# Patient Record
Sex: MALE | Race: WHITE | NOT HISPANIC OR LATINO | Employment: FULL TIME | URBAN - METROPOLITAN AREA
[De-identification: names, ages, dates, MRNs, and addresses within clinical notes are randomized per-mention and may not be internally consistent; named-entity substitution may affect disease eponyms.]

---

## 2023-06-26 ENCOUNTER — HOSPITAL ENCOUNTER (EMERGENCY)
Facility: HOSPITAL | Age: 43
Discharge: HOME | End: 2023-06-26
Attending: EMERGENCY MEDICINE
Payer: MEDICAID

## 2023-06-26 VITALS
HEIGHT: 66 IN | RESPIRATION RATE: 17 BRPM | BODY MASS INDEX: 34.39 KG/M2 | HEART RATE: 100 BPM | WEIGHT: 214 LBS | TEMPERATURE: 99 F | DIASTOLIC BLOOD PRESSURE: 99 MMHG | OXYGEN SATURATION: 98 % | SYSTOLIC BLOOD PRESSURE: 154 MMHG

## 2023-06-26 DIAGNOSIS — R21 RASH AND NONSPECIFIC SKIN ERUPTION: Primary | ICD-10-CM

## 2023-06-26 PROCEDURE — 99283 EMERGENCY DEPT VISIT LOW MDM: CPT | Mod: ER

## 2023-06-26 PROCEDURE — 63600175 PHARM REV CODE 636 W HCPCS: Mod: ER | Performed by: PHYSICIAN ASSISTANT

## 2023-06-26 RX ORDER — PREDNISONE 10 MG/1
TABLET ORAL
Qty: 74 TABLET | Refills: 0 | Status: SHIPPED | OUTPATIENT
Start: 2023-06-26 | End: 2023-07-15

## 2023-06-26 RX ORDER — PREDNISONE 20 MG/1
60 TABLET ORAL
Status: COMPLETED | OUTPATIENT
Start: 2023-06-26 | End: 2023-06-26

## 2023-06-26 RX ADMIN — PREDNISONE 60 MG: 20 TABLET ORAL at 05:06

## 2023-06-26 NOTE — DISCHARGE INSTRUCTIONS
Please take the prescribed steroids according to the directions on the bottle starting tomorrow, 06/27/2023.  You have already received the maximum dose of steroids today, so it is important that you do not take any steroid until tomorrow.  At home, you may use topical oatmeal baths, cool wet compresses, ice packs, calamine lotion, and take over-the-counter Benadryl as needed for additional symptom relief.  Please schedule follow up appointment with a dermatologist for re-evaluation.

## 2023-06-26 NOTE — ED PROVIDER NOTES
Encounter Date: 6/26/2023    SCRIBE #1 NOTE: I, Dacia Garcia, am scribing for, and in the presence of,  AVERY Franco. I have scribed the following portions of the note - Other sections scribed: HPI/ROS/PE.     History     Chief Complaint   Patient presents with    Rash     Rash after cutting tree that fell onto boat.  Airway patent respirations unlabored.      Nick Cloud is a 42 y.o. male, with no pertinent PMHx, who presents to the ED with rash onset 2 days ago. Pt reports rash from poison ivy/oak. Pt was exposed 4 days ago while cutting down a tree on his boat. Pt notes rash is everywhere: legs, back, abdomen, groin, and shaft of penis. Pt notes awakening today with his eyes swollen, which has now resolved after taking benadryl. Pt also treated his arms with green rubbing alcohol followed by calamine spray and reports this did help with his symptoms of itching/burning. No other exacerbating or alleviating factors.This is the extent of the patient's complaints today in the Emergency Department.       The history is provided by the patient. No  was used.   Review of patient's allergies indicates:  No Known Allergies  No past medical history on file.  No past surgical history on file.  No family history on file.     Review of Systems   Constitutional:  Negative for chills, fatigue and fever.   HENT:  Negative for congestion, sinus pressure, sinus pain, sneezing and sore throat.    Eyes:  Negative for visual disturbance.   Respiratory:  Negative for cough, shortness of breath and wheezing.    Cardiovascular:  Negative for chest pain.   Gastrointestinal:  Negative for abdominal pain, nausea and vomiting.   Genitourinary:  Negative for difficulty urinating.   Musculoskeletal:  Negative for back pain.   Skin:  Positive for rash.   Neurological:  Negative for syncope and headaches.     Physical Exam     Initial Vitals [06/26/23 1626]   BP Pulse Resp Temp SpO2   (!) 166/117 109 18 98.2 °F (36.8  °C) 97 %      MAP       --         Physical Exam    Nursing note and vitals reviewed.  Constitutional: He appears well-developed and well-nourished. He is not diaphoretic. No distress.   HENT:   Head: Normocephalic and atraumatic.   Right Ear: External ear normal.   Left Ear: External ear normal.   Nose: Nose normal.   Eyes: Conjunctivae are normal.   Cardiovascular:  Normal rate, regular rhythm and intact distal pulses.           Pulmonary/Chest: Breath sounds normal. No respiratory distress. He has no wheezes.   Abdominal: He exhibits no distension.   Musculoskeletal:         General: No edema.     Neurological: He is alert and oriented to person, place, and time. GCS score is 15. GCS eye subscore is 4. GCS verbal subscore is 5. GCS motor subscore is 6.   Skin: Skin is warm and dry. Rash noted. Rash is maculopapular.   Rash noted to bilateral arms, abdomen, left lower back consistent with contact dermatitis from poison ivy/oak. No blisters seen.  exam limited as pt was evaluated in the hallway due to lack of ED rooms available.   Psychiatric: He has a normal mood and affect. His behavior is normal. Judgment normal.       ED Course   Procedures  Labs Reviewed - No data to display       Imaging Results    None          Medications   predniSONE tablet 60 mg (60 mg Oral Given 6/26/23 1759)     Medical Decision Making:   Differential Diagnosis:   Poison ivy, poison oak, contact dermatitis, atopic dermatitis, SJS  ED Management:  Nick Cloud is a 42 y.o. male, with no pertinent PMHx, who presents to the ED with rash onset 2 days ago. Pt reports rash from poison ivy/oak. Pt was exposed 4 days ago while cutting down a tree on his boat. Pt notes rash is everywhere: legs, back, abdomen, groin, and shaft of penis. Pt notes awakening today with his eyes swollen, which has now resolved after taking benadryl. Pt also treated his arms with green rubbing alcohol followed by calamine spray and reports this did help with his  symptoms of itching/burning. No other exacerbating or alleviating factors.This is the extent of the patient's complaints today in the Emergency Department. On physical exam pt has a rash noted to bilateral arms, abdomen, left lower back consistent with contact dermatitis from poison ivy/oak. No blisters seen.  exam limited as pt was evaluated in the hallway due to lack of ED rooms available.  Given the patient's history of the rash being present on his groin and penis as well, we will treat for severe/widespread infection with oral prednisolone taper.  Patient was instructed that he may also use supportive care including oatmeal baths, cold compresses, calamine lotion, and OTC benadryl for additional pain relief.  He will be given a referral to Dermatology to follow up with.          Scribe Attestation:   Scribe #1: I performed the above scribed service and the documentation accurately describes the services I performed. I attest to the accuracy of the note.                   Clinical Impression:   Final diagnoses:  [R21] Rash and nonspecific skin eruption (Primary)     I, Bing Gerard, personally performed the services described in this documentation. All medical record entries made by the scribe were at my direction and in my presence. I have reviewed the chart and agree that the record reflects my personal performance and is accurate and complete.    ED Disposition Condition    Discharge Stable          ED Prescriptions       Medication Sig Dispense Start Date End Date Auth. Provider    predniSONE (DELTASONE) 10 MG tablet Take 6 tablets (60 mg total) by mouth once daily for 3 days, THEN 5 tablets (50 mg total) once daily for 4 days, THEN 4 tablets (40 mg total) once daily for 4 days, THEN 3 tablets (30 mg total) once daily for 4 days, THEN 2 tablets (20 mg total) once daily for 4 days. Take 6 tabs x 3 days, then take 5 tabs x 4 days, then take 4 tab x 4 days, then take 3 tabs x 4 days, then take 2 tabs x 4  days starting 6/27/23. 74 tablet 6/26/2023 7/15/2023 Bing Gerard PA-C          Follow-up Information       Follow up With Specialties Details Why Contact Info    [facility] [place] [facility] Allergy, Dermatology Schedule an appointment as soon as possible for a visit in 3 days For follow up [address] [facility] [address]  [phone]      [facility] ED Emergency Medicine Go to  As needed, If symptoms worsen [address] [id number] [phone]             Bing Gerard PA-C  06/27/23 1022